# Patient Record
Sex: FEMALE | Race: WHITE | Employment: FULL TIME | URBAN - METROPOLITAN AREA
[De-identification: names, ages, dates, MRNs, and addresses within clinical notes are randomized per-mention and may not be internally consistent; named-entity substitution may affect disease eponyms.]

---

## 2021-06-09 ENCOUNTER — OFFICE VISIT (OUTPATIENT)
Dept: INTERNAL MEDICINE CLINIC | Facility: CLINIC | Age: 51
End: 2021-06-09
Payer: COMMERCIAL

## 2021-06-09 VITALS
TEMPERATURE: 97 F | DIASTOLIC BLOOD PRESSURE: 70 MMHG | HEART RATE: 62 BPM | SYSTOLIC BLOOD PRESSURE: 102 MMHG | RESPIRATION RATE: 16 BRPM | OXYGEN SATURATION: 99 % | WEIGHT: 113 LBS | BODY MASS INDEX: 22.19 KG/M2 | HEIGHT: 60 IN

## 2021-06-09 DIAGNOSIS — L30.9 DERMATITIS: Primary | ICD-10-CM

## 2021-06-09 PROCEDURE — 3074F SYST BP LT 130 MM HG: CPT | Performed by: INTERNAL MEDICINE

## 2021-06-09 PROCEDURE — 3078F DIAST BP <80 MM HG: CPT | Performed by: INTERNAL MEDICINE

## 2021-06-09 PROCEDURE — 3008F BODY MASS INDEX DOCD: CPT | Performed by: INTERNAL MEDICINE

## 2021-06-09 PROCEDURE — 99213 OFFICE O/P EST LOW 20 MIN: CPT | Performed by: INTERNAL MEDICINE

## 2021-06-09 RX ORDER — PREDNISONE 20 MG/1
TABLET ORAL
Qty: 21 TABLET | Refills: 0 | Status: SHIPPED | OUTPATIENT
Start: 2021-06-09 | End: 2021-08-30 | Stop reason: ALTCHOICE

## 2021-06-09 NOTE — PROGRESS NOTES
Patient Office Visit    ASSESSMENT AND PLAN:   (L30.9) Dermatitis  (primary encounter diagnosis)  Plan: predniSONE 20 MG Oral Tab  Note: will start medications above. May take 24-48 hours to work. Discussed the side effects of the medications.  Follow up in removed.  no further disease       Past Surgical History:   Procedure Laterality Date   •   2003   •   10/30/2004    twin delivery       Social History:  Social History    Tobacco Use      Smoking status: Never Smoker      Smokeless t weakness in UE and LE, reflexes are normal  Skin: erythematous rash with slight blisters and excoriations noted on the neck, back, arms  Psychiatric:normal mood

## 2021-08-30 ENCOUNTER — HOSPITAL ENCOUNTER (OUTPATIENT)
Dept: GENERAL RADIOLOGY | Age: 51
Discharge: HOME | End: 2021-08-30
Attending: INTERNAL MEDICINE
Payer: COMMERCIAL

## 2021-08-30 ENCOUNTER — OFFICE VISIT (OUTPATIENT)
Dept: INTERNAL MEDICINE CLINIC | Facility: CLINIC | Age: 51
End: 2021-08-30
Payer: COMMERCIAL

## 2021-08-30 VITALS
HEART RATE: 71 BPM | HEIGHT: 60.75 IN | OXYGEN SATURATION: 98 % | DIASTOLIC BLOOD PRESSURE: 72 MMHG | TEMPERATURE: 98 F | BODY MASS INDEX: 21.88 KG/M2 | WEIGHT: 114.38 LBS | SYSTOLIC BLOOD PRESSURE: 108 MMHG | RESPIRATION RATE: 14 BRPM

## 2021-08-30 DIAGNOSIS — M79.671 BILATERAL FOOT PAIN: Primary | ICD-10-CM

## 2021-08-30 DIAGNOSIS — M79.672 BILATERAL FOOT PAIN: ICD-10-CM

## 2021-08-30 DIAGNOSIS — M79.671 BILATERAL FOOT PAIN: ICD-10-CM

## 2021-08-30 DIAGNOSIS — M79.672 BILATERAL FOOT PAIN: Primary | ICD-10-CM

## 2021-08-30 DIAGNOSIS — Z01.89 ENCOUNTER FOR ROUTINE LABORATORY TESTING: ICD-10-CM

## 2021-08-30 DIAGNOSIS — Z12.31 ENCOUNTER FOR SCREENING MAMMOGRAM FOR BREAST CANCER: ICD-10-CM

## 2021-08-30 PROCEDURE — 3074F SYST BP LT 130 MM HG: CPT | Performed by: INTERNAL MEDICINE

## 2021-08-30 PROCEDURE — 73630 X-RAY EXAM OF FOOT: CPT | Performed by: INTERNAL MEDICINE

## 2021-08-30 PROCEDURE — 99213 OFFICE O/P EST LOW 20 MIN: CPT | Performed by: INTERNAL MEDICINE

## 2021-08-30 PROCEDURE — 3078F DIAST BP <80 MM HG: CPT | Performed by: INTERNAL MEDICINE

## 2021-08-30 PROCEDURE — 3008F BODY MASS INDEX DOCD: CPT | Performed by: INTERNAL MEDICINE

## 2021-08-30 RX ORDER — ELECTROLYTES/DEXTROSE
1 SOLUTION, ORAL ORAL DAILY
COMMUNITY

## 2021-08-30 NOTE — PROGRESS NOTES
Delvis Omalley is a 52year old female  Patient presents with:  Establish Care: New Patient  Foot Pain: Constant - Both Feet - started out in [place] about 3 weeks ago and now has progressed into full bottom of feet and now Right Big Toe Joint Also Metabolic Panel (14)      CBC With Differential With Platelet      Lipid Panel      TSH W Reflex To Free T4      Meds & Refills for this Visit:  Requested Prescriptions      No prescriptions requested or ordered in this encounter       Imaging & Consults:

## 2021-09-02 ENCOUNTER — MED REC SCAN ONLY (OUTPATIENT)
Dept: INTERNAL MEDICINE CLINIC | Facility: CLINIC | Age: 51
End: 2021-09-02

## 2021-10-11 ENCOUNTER — HOSPITAL ENCOUNTER (OUTPATIENT)
Dept: MAMMOGRAPHY | Age: 51
Discharge: HOME | End: 2021-10-11
Attending: INTERNAL MEDICINE
Payer: COMMERCIAL

## 2021-10-11 DIAGNOSIS — Z12.31 ENCOUNTER FOR SCREENING MAMMOGRAM FOR BREAST CANCER: ICD-10-CM

## 2021-10-11 PROCEDURE — 77067 SCR MAMMO BI INCL CAD: CPT | Performed by: INTERNAL MEDICINE

## 2021-10-11 PROCEDURE — 77063 BREAST TOMOSYNTHESIS BI: CPT | Performed by: INTERNAL MEDICINE

## 2021-10-14 ENCOUNTER — LAB ENCOUNTER (OUTPATIENT)
Dept: LAB | Age: 51
End: 2021-10-14
Attending: INTERNAL MEDICINE
Payer: COMMERCIAL

## 2021-10-14 DIAGNOSIS — Z01.89 ENCOUNTER FOR ROUTINE LABORATORY TESTING: ICD-10-CM

## 2021-10-14 PROCEDURE — 80061 LIPID PANEL: CPT | Performed by: INTERNAL MEDICINE

## 2021-10-14 PROCEDURE — 80050 GENERAL HEALTH PANEL: CPT | Performed by: INTERNAL MEDICINE

## 2021-10-19 ENCOUNTER — OFFICE VISIT (OUTPATIENT)
Dept: INTERNAL MEDICINE CLINIC | Facility: CLINIC | Age: 51
End: 2021-10-19
Payer: COMMERCIAL

## 2021-10-19 VITALS
RESPIRATION RATE: 14 BRPM | BODY MASS INDEX: 21.52 KG/M2 | WEIGHT: 114 LBS | DIASTOLIC BLOOD PRESSURE: 62 MMHG | OXYGEN SATURATION: 99 % | SYSTOLIC BLOOD PRESSURE: 110 MMHG | HEART RATE: 66 BPM | TEMPERATURE: 98 F | HEIGHT: 61.14 IN

## 2021-10-19 DIAGNOSIS — Z12.31 SCREENING MAMMOGRAM FOR BREAST CANCER: ICD-10-CM

## 2021-10-19 DIAGNOSIS — Z12.11 COLON CANCER SCREENING: ICD-10-CM

## 2021-10-19 DIAGNOSIS — Z23 NEED FOR VACCINATION: ICD-10-CM

## 2021-10-19 DIAGNOSIS — Z00.00 ROUTINE GENERAL MEDICAL EXAMINATION AT A HEALTH CARE FACILITY: Primary | ICD-10-CM

## 2021-10-19 DIAGNOSIS — Z12.4 CERVICAL CANCER SCREENING: ICD-10-CM

## 2021-10-19 PROBLEM — E78.00 HIGH CHOLESTEROL: Status: ACTIVE | Noted: 2021-10-19

## 2021-10-19 PROCEDURE — 99396 PREV VISIT EST AGE 40-64: CPT | Performed by: INTERNAL MEDICINE

## 2021-10-19 PROCEDURE — 3008F BODY MASS INDEX DOCD: CPT | Performed by: INTERNAL MEDICINE

## 2021-10-19 PROCEDURE — 90471 IMMUNIZATION ADMIN: CPT | Performed by: INTERNAL MEDICINE

## 2021-10-19 PROCEDURE — 99000 SPECIMEN HANDLING OFFICE-LAB: CPT | Performed by: INTERNAL MEDICINE

## 2021-10-19 PROCEDURE — 3074F SYST BP LT 130 MM HG: CPT | Performed by: INTERNAL MEDICINE

## 2021-10-19 PROCEDURE — 90686 IIV4 VACC NO PRSV 0.5 ML IM: CPT | Performed by: INTERNAL MEDICINE

## 2021-10-19 PROCEDURE — 87624 HPV HI-RISK TYP POOLED RSLT: CPT | Performed by: INTERNAL MEDICINE

## 2021-10-19 PROCEDURE — 3078F DIAST BP <80 MM HG: CPT | Performed by: INTERNAL MEDICINE

## 2021-10-19 RX ORDER — L. RHAMNOSUS GG/INULIN 20B-200 MG
1 CAPSULE ORAL
COMMUNITY

## 2021-10-19 NOTE — PROGRESS NOTES
HPI:   Valorie Barnhart is a 54year old female who presents for a complete physical exam. .     Wt Readings from Last 6 Encounters:  10/19/21 : 114 lb (51.7 kg)  08/30/21 : 114 lb 6.4 oz (51.9 kg)  06/09/21 : 113 lb (51.3 kg)  10/16/18 : 108 lb (49 kg glands, hearing issues, tinnitus or vertigo  LUNGS: denies shortness of breath with exertion or chronic cough  CARDIOVASCULAR: denies chest pain on exertion, orthopnea, edema or palpitations  GI: denies abdominal pain denies heartburn, denies change in [facility]' Encounter on 10/14/2021   Component Date Value Ref Range Status   • Glucose 10/14/2021 75  70 - 99 mg/dL Final   • Sodium 10/14/2021 140  136 - 145 mmol/L Final   • Potassium 10/14/2021 4.5  3.5 - 5.1 mmol/L Final   • Chloride 10/14/2021 105  98 - 112 mmol Final   • Neutrophil Absolute Prelim 10/14/2021 1.92  1.50 - 7.70 x10 (3) uL Final   • Neutrophil Absolute 10/14/2021 1.92  1.50 - 7.70 x10(3) uL Final   • Lymphocyte Absolute 10/14/2021 1.59  1.00 - 4.00 x10(3) uL Final   • Monocyte Absolute 10/14/2021 0.

## 2021-10-21 ENCOUNTER — MED REC SCAN ONLY (OUTPATIENT)
Dept: INTERNAL MEDICINE CLINIC | Facility: CLINIC | Age: 51
End: 2021-10-21

## 2021-12-06 ENCOUNTER — LAB ENCOUNTER (OUTPATIENT)
Dept: LAB | Facility: HOSPITAL | Age: 51
End: 2021-12-06
Attending: STUDENT IN AN ORGANIZED HEALTH CARE EDUCATION/TRAINING PROGRAM
Payer: COMMERCIAL

## 2021-12-06 DIAGNOSIS — Z01.818 PRE-OP TESTING: ICD-10-CM

## 2022-06-21 ENCOUNTER — TELEMEDICINE (OUTPATIENT)
Dept: INTERNAL MEDICINE CLINIC | Facility: CLINIC | Age: 52
End: 2022-06-21

## 2022-06-21 DIAGNOSIS — U07.1 COVID-19 VIRUS INFECTION: Primary | ICD-10-CM

## 2022-06-21 PROCEDURE — 99213 OFFICE O/P EST LOW 20 MIN: CPT | Performed by: INTERNAL MEDICINE

## 2022-10-13 ENCOUNTER — HOSPITAL ENCOUNTER (OUTPATIENT)
Dept: MAMMOGRAPHY | Age: 52
Discharge: HOME | End: 2022-10-13
Attending: INTERNAL MEDICINE
Payer: COMMERCIAL

## 2022-10-13 DIAGNOSIS — Z12.31 SCREENING MAMMOGRAM FOR BREAST CANCER: ICD-10-CM

## 2022-10-13 PROCEDURE — 77063 BREAST TOMOSYNTHESIS BI: CPT | Performed by: INTERNAL MEDICINE

## 2022-10-13 PROCEDURE — 77067 SCR MAMMO BI INCL CAD: CPT | Performed by: INTERNAL MEDICINE

## 2022-11-28 ENCOUNTER — OFFICE VISIT (OUTPATIENT)
Dept: INTERNAL MEDICINE CLINIC | Facility: CLINIC | Age: 52
End: 2022-11-28
Payer: COMMERCIAL

## 2022-11-28 VITALS
RESPIRATION RATE: 12 BRPM | BODY MASS INDEX: 20.05 KG/M2 | DIASTOLIC BLOOD PRESSURE: 72 MMHG | WEIGHT: 104.81 LBS | HEIGHT: 60.75 IN | HEART RATE: 60 BPM | SYSTOLIC BLOOD PRESSURE: 110 MMHG | TEMPERATURE: 97 F

## 2022-11-28 DIAGNOSIS — N39.3 STRESS INCONTINENCE: ICD-10-CM

## 2022-11-28 DIAGNOSIS — Z12.11 SCREENING FOR COLON CANCER: ICD-10-CM

## 2022-11-28 DIAGNOSIS — Z23 NEED FOR VACCINATION: ICD-10-CM

## 2022-11-28 DIAGNOSIS — Z00.00 PHYSICAL EXAM, ANNUAL: Primary | ICD-10-CM

## 2022-11-28 PROCEDURE — 90715 TDAP VACCINE 7 YRS/> IM: CPT | Performed by: INTERNAL MEDICINE

## 2022-11-28 PROCEDURE — 99396 PREV VISIT EST AGE 40-64: CPT | Performed by: INTERNAL MEDICINE

## 2022-11-28 PROCEDURE — 3078F DIAST BP <80 MM HG: CPT | Performed by: INTERNAL MEDICINE

## 2022-11-28 PROCEDURE — 3008F BODY MASS INDEX DOCD: CPT | Performed by: INTERNAL MEDICINE

## 2022-11-28 PROCEDURE — 3074F SYST BP LT 130 MM HG: CPT | Performed by: INTERNAL MEDICINE

## 2022-11-28 PROCEDURE — 90471 IMMUNIZATION ADMIN: CPT | Performed by: INTERNAL MEDICINE

## 2024-04-02 ENCOUNTER — TELEPHONE (OUTPATIENT)
Dept: INTERNAL MEDICINE CLINIC | Facility: CLINIC | Age: 54
End: 2024-04-02

## 2024-05-23 ENCOUNTER — OFFICE VISIT (OUTPATIENT)
Dept: INTERNAL MEDICINE CLINIC | Facility: CLINIC | Age: 54
End: 2024-05-23

## 2024-05-23 VITALS
WEIGHT: 106.5 LBS | DIASTOLIC BLOOD PRESSURE: 70 MMHG | SYSTOLIC BLOOD PRESSURE: 106 MMHG | TEMPERATURE: 97 F | RESPIRATION RATE: 12 BRPM | HEART RATE: 64 BPM | HEIGHT: 60.75 IN | BODY MASS INDEX: 20.37 KG/M2

## 2024-05-23 DIAGNOSIS — H91.93 BILATERAL HEARING LOSS, UNSPECIFIED HEARING LOSS TYPE: ICD-10-CM

## 2024-05-23 DIAGNOSIS — M25.512 CHRONIC LEFT SHOULDER PAIN: ICD-10-CM

## 2024-05-23 DIAGNOSIS — Z12.11 SCREENING FOR COLON CANCER: ICD-10-CM

## 2024-05-23 DIAGNOSIS — E04.1 RIGHT THYROID NODULE: ICD-10-CM

## 2024-05-23 DIAGNOSIS — G89.29 CHRONIC LEFT SHOULDER PAIN: ICD-10-CM

## 2024-05-23 DIAGNOSIS — Z00.00 PHYSICAL EXAM, ANNUAL: Primary | ICD-10-CM

## 2024-05-23 DIAGNOSIS — Z12.31 ENCOUNTER FOR SCREENING MAMMOGRAM FOR MALIGNANT NEOPLASM OF BREAST: ICD-10-CM

## 2024-05-23 RX ORDER — FLUTICASONE PROPIONATE 50 MCG
2 SPRAY, SUSPENSION (ML) NASAL DAILY
COMMUNITY

## 2024-05-23 NOTE — PROGRESS NOTES
[facility]    CHIEF COMPLAINT:   Chief Complaint   Patient presents with    Routine Physical     10/19/21-normal pap, neg HPV. 10/13/22-mammo. 9/27/16-neg FIT         HPI:   Renea Silvestre is a 53 year old female who presents for a complete physical exam. Symptoms: denies discharge, itching, burning or dysuria.     Pap done 10/2021 negative with negative hpv.   Mammo done 10/2022, due now.   Colonoscopy not done. She promises to do this year.     Up to date tdap.   Due shingrix, covid booster. Get at her local pharmacy.     Exercise: barre exercises.     Denies any chest pain or shortness of breath.   [place] work. About 25 minutes each way. Once in a while feels a bit out of breath when she does the walk. This is not consistent and she does not have to stop her activity.     Complains of hearing loss.  and kids have been pointing it out to her. Would like to get her ears checked.     Has pain in left shoulder for about 6 months. She has a remote injury to that shoulder years ago and it has never quite been the same since. About 6 months ago she hyperextended her shoulder when she caught herself to prevent a fall. Since then has had pain with reaching, flexion and pulling. Has not gone away. Limits her with the barre exercises.     Wt Readings from Last 6 Encounters:   05/23/24 106 lb 8 oz (48.3 kg)   11/28/22 104 lb 12.8 oz (47.5 kg)   12/06/21 114 lb (51.7 kg)   10/19/21 114 lb (51.7 kg)   08/30/21 114 lb 6.4 oz (51.9 kg)   06/09/21 113 lb (51.3 kg)     Body mass index is 20.29 kg/m².       Current Outpatient Medications   Medication Sig Dispense Refill    fluticasone propionate 50 MCG/ACT Nasal Suspension 2 sprays by Nasal route daily.        Past Medical History:    Food intolerance    High cholesterol    Leaking of urine    Morphea scleroderma    one spot on left leg, removed. no further disease    Night sweats    Sputum production    cough up phlegm  mostly in mornings    Wears glasses    Weight gain      Past Surgical History:   Procedure Laterality Date      2003      10/30/2004    twin delivery      Family History   Problem Relation Age of Onset    Diabetes Father     Psychiatric Father         depression    Cancer Father 74        ocular and brain lymphoma    Psychiatric Mother         depression    Pulmonary Disease Mother         interstitial lung disease    Other (Other) Mother         autoimmune disorder led to interstitial lung disease and cirrhosis of the liver    Other Mother         interstitial lung disease & cirrhosis of the liver (non-alcohol)    Psychiatric Son         anxiety    No Known Problems Sister       Social History:   Social History     Socioeconomic History    Marital status:    Tobacco Use    Smoking status: Never    Smokeless tobacco: Never   Vaping Use    Vaping status: Never Used   Substance and Sexual Activity    Alcohol use: Yes     Comment: approx 1 drink per week    Drug use: Never    Sexual activity: Yes     Partners: Male   Other Topics Concern    Caffeine Concern No    Exercise Yes    Seat Belt Yes    Special Diet No    Stress Concern Yes    Weight Concern No     Occ: . : yes. Children: yes.   Exercise: walking, barre exercises .  Diet: watches calories closely     REVIEW OF SYSTEMS:   GENERAL: feels well otherwise  SKIN: denies any unusual skin lesions  EYES:denies blurred vision or double vision  HEENT: denies nasal congestion, sinus pain or ST  LUNGS: denies shortness of breath with exertion  CARDIOVASCULAR: denies chest pain on exertion  GI: denies abdominal pain,denies heartburn  : denies dysuria, vaginal discharge or itching  MUSCULOSKELETAL: denies back pain  NEURO: denies headaches  PSYCHE: denies depression or anxiety  HEMATOLOGIC: denies hx of anemia  ENDOCRINE: denies thyroid history  ALL/ASTHMA: denies hx of allergy or asthma    EXAM:   /70 (BP Location: Left  arm, Patient Position: Sitting, Cuff Size: adult)   Pulse 64   Temp 97.2 °F (36.2 °C) (Temporal)   Resp 12   Ht 5' 0.75\" (1.543 m)   Wt 106 lb 8 oz (48.3 kg)   Breastfeeding No   BMI 20.29 kg/m²   Body mass index is 20.29 kg/m².   GENERAL: well developed, well nourished,in no apparent distress  SKIN: no rashes,no suspicious lesions  HEENT: atraumatic, normocephalic,ears and throat are clear. Hearing: air conduction better than bone bilaterally. Khan lateralizes to the left possibly, pt unsure.   EYES:PERRLA, conjunctiva are clear  NECK: supple,no adenopathy,right thyroid nodule palpated, no tenderness  CHEST: no chest tenderness  LUNGS: clear to auscultation  CARDIO: nl s1 and s2, RRR without murmur  GI: good BS's,no masses, HSM or tenderness  BREAST: no dominant or suspicious mass, no nipple discharge  GENITAL/URINARY:  External Genitalia:  General appearance; normal, Hair distribution; normal, Lesions absent, Urethral Meatus:  Size normal, Location normal, Lesions absent, Prolapse absent, Vagina:  General appearance normal, Lesions absent, Pelvic support normal, Cervix:  General appearance normal, Discharge absent, Tenderness absent, Enlargement absent, Uterus:  Masses absent, Adnexa:  Masses absent, Tenderness absent, Anus/Perineum:  Lesions absent and Masses absent  No pap today.   MUSCULOSKELETAL: back is not tender,FROM of the back.   EXTREMITIES: no cyanosis, clubbing or edema  NEURO: Oriented times three,cranial nerves are intact,motor and sensory are grossly intact    Labs:   Lab Results   Component Value Date/Time    WBC 4.2 10/14/2021 08:11 AM    HGB 12.1 10/14/2021 08:11 AM    .0 10/14/2021 08:11 AM      Lab Results   Component Value Date/Time    GLU 75 10/14/2021 08:11 AM     10/14/2021 08:11 AM    K 4.5 10/14/2021 08:11 AM     10/14/2021 08:11 AM    CO2 29.0 10/14/2021 08:11 AM    CREATSERUM 0.99 10/14/2021 08:11 AM    CA 9.7 10/14/2021 08:11 AM    ALB 3.8 10/14/2021 08:11 AM     TP 7.4 10/14/2021 08:11 AM    ALKPHO 99 10/14/2021 08:11 AM    AST 16 10/14/2021 08:11 AM    ALT 18 10/14/2021 08:11 AM    BILT 1.0 10/14/2021 08:11 AM    TSH 1.200 10/14/2021 08:11 AM        Lab Results   Component Value Date/Time    CHOLEST 236 (H) 10/14/2021 08:11 AM    HDL 67 (H) 10/14/2021 08:11 AM    TRIG 70 10/14/2021 08:11 AM     (H) 10/14/2021 08:11 AM    NONHDLC 169 (H) 10/14/2021 08:11 AM       No results found for: \"A1C\"   Vitamin D:    No results found for: \"VITD\"        ASSESSMENT AND PLAN:   Renea Silvestre is a 53 year old female who presents for a complete physical exam.   1. Physical exam, annual  Do labs.   Pelvic and breast exam done.   HM discussed.   Immunizations discussed.   Continue regular exercise.   Rtc if any shortness of breath or decrease in exercise tolerance.   - CBC With Differential With Platelet; Future  - Comp Metabolic Panel; Future  - Lipid Panel; Future  - Hemoglobin A1C; Future  - TSH W Reflex To Free T4; Future    2. Encounter for screening mammogram for malignant neoplasm of breast  - KATHY NEYDA 2D+3D SCREENING BILAT (CPT=77067/77493); Future    3. Screening for colon cancer  - EVALUATE & TREAT, GASTRO (INTERNAL)    4. Right thyroid nodule  - US THYROID (CPT=76536); Future    5. Bilateral hearing loss, unspecified hearing loss type  - OP REFERRAL TO AUDIOLOGY    6. Chronic left shoulder pain  - Physical Therapy Referral - [facility]  If no relief will do referral to ortho.         Return in about 1 year (around 5/23/2025) for physical.      Bruna Nino MD

## 2024-06-04 ENCOUNTER — HOSPITAL ENCOUNTER (OUTPATIENT)
Dept: MAMMOGRAPHY | Age: 54
Discharge: HOME | End: 2024-06-04
Attending: INTERNAL MEDICINE
Payer: COMMERCIAL

## 2024-06-04 ENCOUNTER — HOSPITAL ENCOUNTER (OUTPATIENT)
Dept: ULTRASOUND IMAGING | Age: 54
Discharge: HOME | End: 2024-06-04
Attending: INTERNAL MEDICINE
Payer: COMMERCIAL

## 2024-06-04 DIAGNOSIS — Z12.31 ENCOUNTER FOR SCREENING MAMMOGRAM FOR MALIGNANT NEOPLASM OF BREAST: ICD-10-CM

## 2024-06-04 DIAGNOSIS — E04.1 RIGHT THYROID NODULE: ICD-10-CM

## 2024-06-04 PROCEDURE — 77067 SCR MAMMO BI INCL CAD: CPT | Performed by: INTERNAL MEDICINE

## 2024-06-04 PROCEDURE — 77063 BREAST TOMOSYNTHESIS BI: CPT | Performed by: INTERNAL MEDICINE

## 2024-06-04 PROCEDURE — 76536 US EXAM OF HEAD AND NECK: CPT | Performed by: INTERNAL MEDICINE

## 2024-06-05 ENCOUNTER — TELEPHONE (OUTPATIENT)
Dept: INTERNAL MEDICINE CLINIC | Facility: CLINIC | Age: 54
End: 2024-06-05

## 2024-06-27 ENCOUNTER — LAB ENCOUNTER (OUTPATIENT)
Dept: LAB | Age: 54
End: 2024-06-27
Attending: INTERNAL MEDICINE

## 2024-06-27 DIAGNOSIS — Z00.00 PHYSICAL EXAM, ANNUAL: ICD-10-CM

## 2024-06-27 LAB
ALBUMIN SERPL-MCNC: 4.7 G/DL (ref 3.2–4.8)
ALBUMIN/GLOB SERPL: 1.7 {RATIO} (ref 1–2)
ALP LIVER SERPL-CCNC: 96 U/L
ALT SERPL-CCNC: 11 U/L
ANION GAP SERPL CALC-SCNC: 7 MMOL/L (ref 0–18)
AST SERPL-CCNC: 23 U/L (ref ?–34)
BASOPHILS # BLD AUTO: 0.07 X10(3) UL (ref 0–0.2)
BASOPHILS NFR BLD AUTO: 1.8 %
BILIRUB SERPL-MCNC: 1.2 MG/DL (ref 0.3–1.2)
BUN BLD-MCNC: 13 MG/DL (ref 9–23)
BUN/CREAT SERPL: 13.3 (ref 10–20)
CALCIUM BLD-MCNC: 10.2 MG/DL (ref 8.7–10.4)
CHLORIDE SERPL-SCNC: 105 MMOL/L (ref 98–112)
CHOLEST SERPL-MCNC: 224 MG/DL (ref ?–200)
CO2 SERPL-SCNC: 28 MMOL/L (ref 21–32)
CREAT BLD-MCNC: 0.98 MG/DL
DEPRECATED RDW RBC AUTO: 41.8 FL (ref 35.1–46.3)
EGFRCR SERPLBLD CKD-EPI 2021: 70 ML/MIN/1.73M2 (ref 60–?)
EOSINOPHIL # BLD AUTO: 0.24 X10(3) UL (ref 0–0.7)
EOSINOPHIL NFR BLD AUTO: 6 %
ERYTHROCYTE [DISTWIDTH] IN BLOOD BY AUTOMATED COUNT: 12.1 % (ref 11–15)
EST. AVERAGE GLUCOSE BLD GHB EST-MCNC: 105 MG/DL (ref 68–126)
FASTING PATIENT LIPID ANSWER: YES
FASTING STATUS PATIENT QL REPORTED: YES
GLOBULIN PLAS-MCNC: 2.8 G/DL (ref 2–3.5)
GLUCOSE BLD-MCNC: 85 MG/DL (ref 70–99)
HBA1C MFR BLD: 5.3 % (ref ?–5.7)
HCT VFR BLD AUTO: 38.4 %
HDLC SERPL-MCNC: 76 MG/DL (ref 40–59)
HGB BLD-MCNC: 13 G/DL
IMM GRANULOCYTES # BLD AUTO: 0 X10(3) UL (ref 0–1)
IMM GRANULOCYTES NFR BLD: 0 %
LDLC SERPL CALC-MCNC: 139 MG/DL (ref ?–100)
LYMPHOCYTES # BLD AUTO: 1.48 X10(3) UL (ref 1–4)
LYMPHOCYTES NFR BLD AUTO: 37.1 %
MCH RBC QN AUTO: 31.6 PG (ref 26–34)
MCHC RBC AUTO-ENTMCNC: 33.9 G/DL (ref 31–37)
MCV RBC AUTO: 93.4 FL
MONOCYTES # BLD AUTO: 0.51 X10(3) UL (ref 0.1–1)
MONOCYTES NFR BLD AUTO: 12.8 %
NEUTROPHILS # BLD AUTO: 1.69 X10 (3) UL (ref 1.5–7.7)
NEUTROPHILS # BLD AUTO: 1.69 X10(3) UL (ref 1.5–7.7)
NEUTROPHILS NFR BLD AUTO: 42.3 %
NONHDLC SERPL-MCNC: 148 MG/DL (ref ?–130)
OSMOLALITY SERPL CALC.SUM OF ELEC: 289 MOSM/KG (ref 275–295)
PLATELET # BLD AUTO: 222 10(3)UL (ref 150–450)
POTASSIUM SERPL-SCNC: 4.2 MMOL/L (ref 3.5–5.1)
PROT SERPL-MCNC: 7.5 G/DL (ref 5.7–8.2)
RBC # BLD AUTO: 4.11 X10(6)UL
SODIUM SERPL-SCNC: 140 MMOL/L (ref 136–145)
TRIGL SERPL-MCNC: 54 MG/DL (ref 30–149)
TSI SER-ACNC: 0.89 MIU/ML (ref 0.55–4.78)
VLDLC SERPL CALC-MCNC: 10 MG/DL (ref 0–30)
WBC # BLD AUTO: 4 X10(3) UL (ref 4–11)

## 2024-06-27 PROCEDURE — 80061 LIPID PANEL: CPT | Performed by: INTERNAL MEDICINE

## 2024-06-27 PROCEDURE — 83036 HEMOGLOBIN GLYCOSYLATED A1C: CPT | Performed by: INTERNAL MEDICINE

## 2024-06-27 PROCEDURE — 80050 GENERAL HEALTH PANEL: CPT | Performed by: INTERNAL MEDICINE

## 2024-07-24 ENCOUNTER — OFFICE VISIT (OUTPATIENT)
Facility: LOCATION | Age: 54
End: 2024-07-24
Payer: COMMERCIAL

## 2024-07-24 DIAGNOSIS — E04.1 THYROID NODULE: Primary | ICD-10-CM

## 2024-07-24 PROCEDURE — 99203 OFFICE O/P NEW LOW 30 MIN: CPT | Performed by: OTOLARYNGOLOGY

## 2024-07-24 NOTE — PROGRESS NOTES
Renea Silvestre is a 53 year old female. No chief complaint on file.    HPI:   She is found to have thyroid nodule on physical exam.  She has no pain or dysphagia.  She is not on thyroid medication.  She has no family history of thyroid cancer.  Current Outpatient Medications   Medication Sig Dispense Refill    fluticasone propionate 50 MCG/ACT Nasal Suspension 2 sprays by Nasal route daily.        Past Medical History:    Food intolerance    High cholesterol    Leaking of urine    Morphea scleroderma    one spot on left leg, removed. no further disease    Night sweats    Sputum production    cough up phlegm mostly in mornings    Wears glasses    Weight gain      Social History:  Social History     Socioeconomic History    Marital status:    Tobacco Use    Smoking status: Never    Smokeless tobacco: Never   Vaping Use    Vaping status: Never Used   Substance and Sexual Activity    Alcohol use: Yes     Comment: approx 1 drink per week    Drug use: Never    Sexual activity: Yes     Partners: Male   Other Topics Concern    Caffeine Concern No    Exercise Yes    Seat Belt Yes    Special Diet No    Stress Concern Yes    Weight Concern No      Past Surgical History:   Procedure Laterality Date      2003      10/30/2004    twin delivery         REVIEW OF SYSTEMS:   GENERAL HEALTH: feels well otherwise  GENERAL : denies fever, chills, sweats, weight loss, weight gain  SKIN: denies any unusual skin lesions or rashes  RESPIRATORY: denies shortness of breath with exertion  NEURO: denies headaches    EXAM:   There were no vitals taken for this visit.    System Findings Details   Constitutional  Overall appearance - Normal.   Psychiatric  Orientation - Oriented to time, place, person & situation. Appropriate mood and affect.   Head/Face  Facial features -- Normal. Skull - Normal.   Eyes  Pupils equal ,round ,react to light and accomidate   Ears, Nose, Throat, Neck  Ears clear pharynx clear neck  reveals a thyroid nodule on the right.   Neurological  Memory - Normal. Cranial nerves - Cranial nerves II through XII grossly intact.   Lymph Detail  Submental. Submandibular. Anterior cervical. Posterior cervical. Supraclavicular.       ASSESSMENT AND PLAN:   1. Thyroid nodule  Ultrasound reviewed.  This shows a TR 4 nodule on the right.  I agree that this nodule should be biopsied.  We will speak after the biopsy.  - US FNA THYROID, GUIDE INCLD, FIRST LESION (CPT=10005); Future      The patient indicates understanding of these issues and agrees to the plan.    No follow-ups on file.    Marquis Thomas MD  7/24/2024  9:31 AM

## 2024-08-07 ENCOUNTER — HOSPITAL ENCOUNTER (OUTPATIENT)
Dept: ULTRASOUND IMAGING | Facility: HOSPITAL | Age: 54
Discharge: HOME | End: 2024-08-07
Attending: OTOLARYNGOLOGY
Payer: COMMERCIAL

## 2024-08-07 DIAGNOSIS — E04.1 THYROID NODULE: ICD-10-CM

## 2024-08-07 PROCEDURE — 10005 FNA BX W/US GDN 1ST LES: CPT | Performed by: OTOLARYNGOLOGY

## 2024-08-07 PROCEDURE — 88173 CYTOPATH EVAL FNA REPORT: CPT | Performed by: OTOLARYNGOLOGY

## 2024-08-30 PROBLEM — Z12.11 SPECIAL SCREENING FOR MALIGNANT NEOPLASM OF COLON: Status: ACTIVE | Noted: 2024-08-30

## 2025-06-05 ENCOUNTER — OFFICE VISIT (OUTPATIENT)
Dept: INTERNAL MEDICINE CLINIC | Facility: CLINIC | Age: 55
End: 2025-06-05
Payer: COMMERCIAL

## 2025-06-05 VITALS
OXYGEN SATURATION: 100 % | TEMPERATURE: 98 F | BODY MASS INDEX: 21.51 KG/M2 | RESPIRATION RATE: 20 BRPM | SYSTOLIC BLOOD PRESSURE: 104 MMHG | HEIGHT: 60.07 IN | DIASTOLIC BLOOD PRESSURE: 66 MMHG | WEIGHT: 111 LBS | HEART RATE: 90 BPM

## 2025-06-05 DIAGNOSIS — L03.113 CELLULITIS OF RIGHT UPPER EXTREMITY: ICD-10-CM

## 2025-06-05 DIAGNOSIS — L23.7 POISON IVY DERMATITIS: Primary | ICD-10-CM

## 2025-06-05 PROCEDURE — 3008F BODY MASS INDEX DOCD: CPT | Performed by: STUDENT IN AN ORGANIZED HEALTH CARE EDUCATION/TRAINING PROGRAM

## 2025-06-05 PROCEDURE — 99213 OFFICE O/P EST LOW 20 MIN: CPT | Performed by: STUDENT IN AN ORGANIZED HEALTH CARE EDUCATION/TRAINING PROGRAM

## 2025-06-05 PROCEDURE — 3074F SYST BP LT 130 MM HG: CPT | Performed by: STUDENT IN AN ORGANIZED HEALTH CARE EDUCATION/TRAINING PROGRAM

## 2025-06-05 PROCEDURE — 3078F DIAST BP <80 MM HG: CPT | Performed by: STUDENT IN AN ORGANIZED HEALTH CARE EDUCATION/TRAINING PROGRAM

## 2025-06-05 RX ORDER — CEPHALEXIN 500 MG/1
500 CAPSULE ORAL 4 TIMES DAILY
Qty: 20 CAPSULE | Refills: 0 | Status: SHIPPED | OUTPATIENT
Start: 2025-06-05 | End: 2025-06-10

## 2025-06-05 RX ORDER — CLOBETASOL PROPIONATE 0.5 MG/G
1 CREAM TOPICAL 2 TIMES DAILY
Qty: 15 G | Refills: 0 | Status: SHIPPED | OUTPATIENT
Start: 2025-06-05 | End: 2025-06-12

## 2025-06-05 NOTE — PATIENT INSTRUCTIONS
VISIT SUMMARY:  During your visit, we discussed the skin infection you developed after being exposed to poison ivy while gardening. The infection has progressed to include redness, pus drainage, and spreading bumps. We have created a treatment plan to address these issues and prevent further complications.    YOUR PLAN:  -CELLULITIS: Cellulitis is a bacterial skin infection that causes redness, swelling, and pus drainage. We have prescribed a 5-day course of antibiotics to treat the infection and clobetasol, a high-dose topical steroid, to reduce itching and inflammation. Please keep the affected area covered to prevent spreading the infection and wash your hands immediately after touching the area. During showers, gently wash the area with water only, avoiding soap. If symptoms do not improve in a week, we may consider systemic steroids.    -POISON IVY DERMATITIS: Poison Ivy Dermatitis is a skin reaction caused by contact with poison ivy, leading to itching and inflammation. The secondary infection is due to contact with pus. We recommend using clobetasol to manage inflammation and itching. Keep the area covered to prevent spreading the reaction. For future poison ivy exposures, use hydrocortisone or clobetasol to prevent infection.    INSTRUCTIONS:  Please follow the prescribed treatment plan and keep the affected area covered. If your symptoms do not improve within a week, schedule a follow-up appointment. Additionally, use hydrocortisone or clobetasol for any future poison ivy exposures to prevent infection.    Contains text generated by Cyndi

## 2025-06-05 NOTE — PROGRESS NOTES
CHIEF COMPLAINT:   Chief Complaint   Patient presents with    Rash     Right arm possible poison ivory  noticed Monday  was garden Sat by Monday morning started looking swollen infection in the area         HPI:   Renea Silvestre is a 54 year old female who presents for poison ivy rash.     Wt Readings from Last 6 Encounters:   06/05/25 111 lb (50.3 kg)   08/30/24 108 lb (49 kg)   05/23/24 106 lb 8 oz (48.3 kg)   11/28/22 104 lb 12.8 oz (47.5 kg)   12/06/21 114 lb (51.7 kg)   10/19/21 114 lb (51.7 kg)     Body mass index is 21.63 kg/m².     History of Present Illness  Renea Silvestre is a 54 year old female who presents with a skin infection following exposure to poison ivy.    She developed a skin reaction after gardening on Saturday, where she identified poison ivy behind another weed. Despite washing her hands thoroughly, symptoms appeared by Monday.    The initial presentation included small vesicles surrounded by erythema, which have since progressed to draining pus. She attempted to manage the symptoms with calamine lotion, but this exacerbated the condition, causing significant swelling.    She is now experiencing additional small bumps and is concerned about the spread of the reaction. The area continues to drain, and she is worried about new lesions appearing.    She has been trying to keep the area clean and covered to prevent further irritation or infection. No itching of the area, although she acknowledges that it might have been rubbed during the night.       Current Medications[1]   Past Medical History[2]   Past Surgical History[3]   Family History[4]   Social History:   Short Social Hx on File[5]     REVIEW OF SYSTEMS:   Negative except for what is mentioned in HPI.     Screenings:   1.    2.    3.    4.    5.    6.    7.    8.    9.             EXAM:   /66 (BP Location: Right arm, Patient Position: Sitting, Cuff Size: adult)   Pulse 90   Temp 98.3 °F (36.8 °C) (Temporal)   Resp 20    Ht 5' 0.07\" (1.526 m)   Wt 111 lb (50.3 kg)   SpO2 100%   BMI 21.63 kg/m²   Body mass index is 21.63 kg/m².   GENERAL: well developed, well nourished,in no apparent distress    Physical Exam  SKIN: R forearm with weeping clear, but yellow fluid, notable expansion of erythema around the site. The R arm also has a lesion on the dorsum side of the arm as well, but looks like just 1 vesicle without evidence of weeping or erythema. Has a lesion on the L arm as well without erythema.               Labs:   Lab Results   Component Value Date/Time    WBC 4.0 06/27/2024 08:18 AM    HGB 13.0 06/27/2024 08:18 AM    .0 06/27/2024 08:18 AM      Lab Results   Component Value Date/Time    GLU 85 06/27/2024 08:18 AM     06/27/2024 08:18 AM    K 4.2 06/27/2024 08:18 AM     06/27/2024 08:18 AM    CO2 28.0 06/27/2024 08:18 AM    CREATSERUM 0.98 06/27/2024 08:18 AM    CA 10.2 06/27/2024 08:18 AM    ALB 4.7 06/27/2024 08:18 AM    TP 7.5 06/27/2024 08:18 AM    ALKPHO 96 06/27/2024 08:18 AM    AST 23 06/27/2024 08:18 AM    ALT 11 06/27/2024 08:18 AM    BILT 1.2 06/27/2024 08:18 AM    TSH 0.885 06/27/2024 08:18 AM        Lab Results   Component Value Date/Time    CHOLEST 224 (H) 06/27/2024 08:18 AM    HDL 76 (H) 06/27/2024 08:18 AM    TRIG 54 06/27/2024 08:18 AM     (H) 06/27/2024 08:18 AM    NONHDLC 148 (H) 06/27/2024 08:18 AM       Lab Results   Component Value Date/Time    A1C 5.3 06/27/2024 08:18 AM      No results found for: \"VITD\"      Imaging:   No results found.    Assessment & Plan  Poison Ivy Dermatitis with superimposed cellulitis   Contact dermatitis from poison ivy exposure, characterized by itching and inflammation. Secondary infection considered due to expansion of erythema, pain, and rash. Yellow drainage also present. Discussed importance of covering the area, washing all cloths and sheets as drainage can spread the infection to other areas.   - Use clobetasol BID x 7 days, a high-dose  topical steroid, to manage inflammation and itching.  - Cephalexin 500mg QID x 5 days for cellulitis.   - Advise keeping the area covered to prevent spreading the reaction.  - Educate on using hydrocortisone or clobetasol for future poison ivy exposures to prevent infection.  - Advise washing hands immediately after contact with the affected area to prevent further spread.  - Recommend gentle water washing without soap during showers to avoid aggravating the infection.  - Consider systemic steroids if symptoms do not improve in a week.    Recording duration: 3 minutes    Return in about 1 week (around 2025) for If symptoms persist or worsen. .    Faith Cuevas MD   Internal Medicine            The following individual(s) verbally consented to be recorded using ambient AI listening technology and understand that they can each withdraw their consent to this listening technology at any point by asking the clinician to turn off or pause the recording:    Patient name: Renea Silvestre               [1]   Current Outpatient Medications   Medication Sig Dispense Refill    clobetasol 0.05 % External Cream Apply 1 Application topically 2 (two) times daily for 7 days. Apply to affected areas 15 g 0    cephALEXin 500 MG Oral Cap Take 1 capsule (500 mg total) by mouth 4 (four) times daily for 5 days. 20 capsule 0    fluticasone propionate 50 MCG/ACT Nasal Suspension 2 sprays by Nasal route daily.     [2]   Past Medical History:   Food intolerance    High cholesterol    Leaking of urine    Morphea scleroderma    one spot on left leg, removed. no further disease    Night sweats    Sputum production    cough up phlegm mostly in mornings    Wears glasses    Weight gain   [3]   Past Surgical History:  Procedure Laterality Date      2003      10/30/2004    twin delivery   [4]   Family History  Problem Relation Age of Onset    Diabetes Father     Psychiatric Father         depression    Cancer Father 74         ocular and brain lymphoma    Psychiatric Mother         depression    Pulmonary Disease Mother         interstitial lung disease    Other (Other) Mother         autoimmune disorder led to interstitial lung disease and cirrhosis of the liver    Other Mother         interstitial lung disease & cirrhosis of the liver (non-alcohol)    Psychiatric Son         anxiety    No Known Problems Sister    [5]   Social History  Socioeconomic History    Marital status:    Tobacco Use    Smoking status: Never    Smokeless tobacco: Never   Vaping Use    Vaping status: Never Used   Substance and Sexual Activity    Alcohol use: Yes     Alcohol/week: 2.0 standard drinks of alcohol     Types: 1 Glasses of wine, 1 Shots of liquor per week     Comment: approx 1 drink per week    Drug use: Never    Sexual activity: Yes     Partners: Male   Other Topics Concern    Caffeine Concern No    Exercise Yes    Seat Belt Yes    Special Diet No    Stress Concern Yes    Weight Concern No

## 2025-08-09 ENCOUNTER — MED REC SCAN ONLY (OUTPATIENT)
Dept: INTERNAL MEDICINE CLINIC | Facility: CLINIC | Age: 55
End: 2025-08-09

## (undated) NOTE — LETTER
10/06/21        [address] [facility] [address]      Dear Yoel Polk,    Our records indicate that you have outstanding lab work and or testing that was ordered for you and has not yet been completed:      Comp Metabolic Panel

## (undated) NOTE — LETTER
4/2/2024  eRnea Silvestre  [address] [facility] [address]    We take each of our patient's health very seriously and the key to maintaining your health is an annual wellness physical.  Review of your medical records shows that it is time for your annual wellness exam.  Please contact my office at your earliest convenience to schedule this appointment at (office phone)  Thank You    Bruna Nino MD

## (undated) NOTE — MR AVS SNAPSHOT
After Visit Summary   10/19/2021    Greyson Findjanell   MRN: [id number]           Visit Information     Date & Time  10/19/2021  8:40 AM Provider  Shaila Stone MD [facility] [place] [facility].  Phone appointment for your radiology test please call Avelina Lozano Scheduling at [phone].                Did you know that [facility] primary care physicians now offer Video Visits through [facility] [employer] for adult patients for a variety of conditions such as al

## (undated) NOTE — LETTER
4/2/2024       Renea Silvestre   [address]  [facility] [address]      Dear Renea,    IF YOU ARE 50 YEARS OF AGE OR OLDER, COLORECTAL CANCER SCREENING CAN SAVE YOUR LIFE.    As your primary care doctor, I want to do everything I can to protect your health.  Colorectal cancer is the second leading cause of cancer-related deaths in the [place].  Polyps and colorectal cancer do not always cause symptoms.  That's why screening is so important.  Regular screening can find colorectal cancer early when it is most curable.  These tests can help prevent the development of colorectal cancer.  All adults 50 and older should have one of the following colon cancer screenings as recommended by the American Cancer Society:    Colonoscopy every ten years or as advised by your physician  iFOBT every year (The iFOBT is a home test to detect blood in the stool. The test is quick, easy and requires no dietary restrictions.)    Please contact my office today so together we can determine which colorectal cancer screening is best for you.  Or, if you have already had one of the above colon cancer screenings, please let me know the date, method of screening, physician and/or facility that performed the screening so I can update your medical record.      If you have a history of colon cancer, colon polyps, or an abnormal colon screening result, please follow the instructions you have previously received from myself or your specialists.    There are no excuses to ignore early detection!  This is one cancer you can prevent.  Regular exams and preventive screenings are among the most important things you can do.  Thank you for taking time to take care of yourself.        Dr. Bruna Nino MD   [phone]